# Patient Record
Sex: FEMALE | Race: WHITE | NOT HISPANIC OR LATINO | URBAN - METROPOLITAN AREA
[De-identification: names, ages, dates, MRNs, and addresses within clinical notes are randomized per-mention and may not be internally consistent; named-entity substitution may affect disease eponyms.]

---

## 2021-12-30 ENCOUNTER — EMERGENCY (EMERGENCY)
Facility: HOSPITAL | Age: 36
LOS: 1 days | Discharge: HOME | End: 2021-12-30
Attending: EMERGENCY MEDICINE
Payer: MEDICAID

## 2021-12-30 VITALS
OXYGEN SATURATION: 98 % | HEART RATE: 95 BPM | TEMPERATURE: 99 F | SYSTOLIC BLOOD PRESSURE: 144 MMHG | WEIGHT: 164.91 LBS | RESPIRATION RATE: 18 BRPM | DIASTOLIC BLOOD PRESSURE: 94 MMHG

## 2021-12-30 VITALS
TEMPERATURE: 99 F | DIASTOLIC BLOOD PRESSURE: 88 MMHG | HEART RATE: 74 BPM | RESPIRATION RATE: 17 BRPM | SYSTOLIC BLOOD PRESSURE: 133 MMHG | OXYGEN SATURATION: 100 %

## 2021-12-30 PROCEDURE — 99283 EMERGENCY DEPT VISIT LOW MDM: CPT | Mod: 25

## 2021-12-30 PROCEDURE — 99284 EMERGENCY DEPT VISIT MOD MDM: CPT

## 2021-12-30 PROCEDURE — 71045 X-RAY EXAM CHEST 1 VIEW: CPT

## 2021-12-30 PROCEDURE — 71045 X-RAY EXAM CHEST 1 VIEW: CPT | Mod: 26

## 2021-12-30 RX ORDER — ACETAMINOPHEN 500 MG
975 TABLET ORAL ONCE
Refills: 0 | Status: COMPLETED | OUTPATIENT
Start: 2021-12-30 | End: 2021-12-30

## 2021-12-30 RX ORDER — IBUPROFEN 200 MG
600 TABLET ORAL ONCE
Refills: 0 | Status: COMPLETED | OUTPATIENT
Start: 2021-12-30 | End: 2021-12-30

## 2021-12-30 RX ADMIN — Medication 600 MILLIGRAM(S): at 03:41

## 2021-12-30 RX ADMIN — Medication 975 MILLIGRAM(S): at 03:41

## 2021-12-30 NOTE — ED ADULT NURSE NOTE - OBJECTIVE STATEMENT
36yF, A&Ox4, independent, PMH HTN tested (+) for COVID on sunday, presents to the ED c/o SOB as well as myalgias. Pt c/o intermittent sob a/w cp when coughing. Patient also endorsing back pain, intermittently. Has not tried any medication at home. Pt not sob on arrival Gross neuro intact, no difficulty speaking in complete sentences, pulses x 4, moving all extremities, abdomen soft nontender nondistended, skin intact. Pt denies fevers/chills, numbness/tingling, weakness, headache, dizziness, vision changes, abd pain, n/v/d, dysuria, hematuria, bloody stools.

## 2021-12-30 NOTE — ED PROVIDER NOTE - CLINICAL SUMMARY MEDICAL DECISION MAKING FREE TEXT BOX
37F here for myalgias chest pain, SOB after testing (+) for COVID. VSS PE unremarkable.  Sx most c/w viral syndrome- no meningeal sx, visual changes, CP, focal lung findings or isolated  sx to suspect focal bacterial source.  Plan for supportive care, CXR, EKG, reassess.  Anticipate d/c w/PMD f/u.

## 2021-12-30 NOTE — ED PROVIDER NOTE - PATIENT PORTAL LINK FT
You can access the FollowMyHealth Patient Portal offered by [facility] by registering at the following website: [email or web address]. By joining [employer]’s FollowMyHealth portal, you will also be able to view your health information using other applications (apps) compatible with our system.

## 2021-12-30 NOTE — ED PROVIDER NOTE - OBJECTIVE STATEMENT
36F PMH of HTN tested (+) for COVID on sunday here for SOB since testing postive and myalgias. Intermittent shortness of breath with chest pain when she coughs. Patient also endorsing back pain that comes and goes. Has not taken any medication at home. Currently not short of breath. Denies n/v/f/d/c/ab pain.

## 2021-12-30 NOTE — ED PROVIDER NOTE - NS ED ROS FT
Gen: No fever, normal appetite  Eyes: No eye irritation or discharge  ENT: No ear pain, congestion, sore throat  Resp: (+) cough,  trouble breathing  Cardiovascular: (+) chest pain or palpitation  Gastroenteric: No nausea/vomiting, diarrhea, constipation  :  No change in urine output; no dysuria  MS: No joint or muscle pain  Skin: No rashes  Neuro: No headache; no abnormal movements  Remainder negative, except as per the HPI

## 2021-12-30 NOTE — ED PROVIDER NOTE - NSFOLLOWUPINSTRUCTIONS_ED_ALL_ED_FT
You received verbal instructions and did not sign because of the risk of infection, and expressed understanding of the discharge instructions. Please followup with your doctor (call) and consider follow up in his/her office. Please practice good hand hygiene and social distancing. If fever, cough, shortness of breath, or any worse symptoms return to the ER.  If you have symptoms, quarantine yourself for 14 days from start of symptoms should be considered.  You can call [phone] for any Covid related questions or your local department of health. ([facility] [phone], or [facility]).

## 2023-05-15 ENCOUNTER — OFFICE (OUTPATIENT)
Dept: URBAN - METROPOLITAN AREA CLINIC 92 | Facility: CLINIC | Age: 38
Setting detail: OPHTHALMOLOGY
End: 2023-05-15
Payer: MEDICAID

## 2023-05-15 DIAGNOSIS — H16.223: ICD-10-CM

## 2023-05-15 DIAGNOSIS — H52.13: ICD-10-CM

## 2023-05-15 DIAGNOSIS — H52.7: ICD-10-CM

## 2023-05-15 PROCEDURE — 92015 DETERMINE REFRACTIVE STATE: CPT | Performed by: OPHTHALMOLOGY

## 2023-05-15 PROCEDURE — 92012 INTRM OPH EXAM EST PATIENT: CPT | Performed by: OPHTHALMOLOGY

## 2023-05-15 ASSESSMENT — REFRACTION_MANIFEST
OS_CYLINDER: SPH
OD_CYLINDER: SPH
OD_CYLINDER: SPH
OS_CYLINDER: SPH
OD_SPHERE: -2.00
OS_VA1: 20/20
OS_SPHERE: -2.00
OD_VA1: 20/20
OD_VA1: 20/20
OS_VA1: 20/20
OD_SPHERE: -2.25
OS_SPHERE: -2.50

## 2023-05-15 ASSESSMENT — AXIALLENGTH_DERIVED
OS_AL: 23.4221
OD_AL: 23.4221

## 2023-05-15 ASSESSMENT — REFRACTION_AUTOREFRACTION
OD_AXIS: 113
OD_SPHERE: -2.75
OS_SPHERE: -3.00
OS_AXIS: 86
OS_CYLINDER: +0.75
OD_CYLINDER: +0.25

## 2023-05-15 ASSESSMENT — SUPERFICIAL PUNCTATE KERATITIS (SPK)
OS_SPK: T
OD_SPK: T

## 2023-05-15 ASSESSMENT — REFRACTION_CURRENTRX
OD_SPHERE: -2.25
OD_OVR_VA: 20/
OS_SPHERE: -2.25
OS_AXIS: 180
OD_CYLINDER: SPH
OD_OVR_VA: 20/
OS_CYLINDER: 0.00
OS_OVR_VA: 20/
OD_AXIS: 180
OS_CYLINDER: SPH
OS_SPHERE: -2.25
OS_OVR_VA: 20/
OD_VPRISM_DIRECTION: SV
OD_CYLINDER: 0.00
OD_SPHERE: -2.25
OS_VPRISM_DIRECTION: SV

## 2023-05-15 ASSESSMENT — KERATOMETRY
OD_AXISANGLE_DEGREES: 102
OS_K2POWER_DIOPTERS: 47.50
OS_K1POWER_DIOPTERS: 46.00
OS_AXISANGLE_DEGREES: 84
OD_K2POWER_DIOPTERS: 47.25
OD_K1POWER_DIOPTERS: 46.25
METHOD_AUTO_MANUAL: AUTO

## 2023-05-15 ASSESSMENT — SPHEQUIV_DERIVED
OS_SPHEQUIV: -2.625
OD_SPHEQUIV: -2.625

## 2023-05-15 ASSESSMENT — VISUAL ACUITY
OS_BCVA: 20/25-2
OD_BCVA: 20/25

## 2024-06-17 ASSESSMENT — KERATOMETRY
METHOD_AUTO_MANUAL: AUTO
OS_K1POWER_DIOPTERS: 46.00
OS_AXISANGLE_DEGREES: 84
OD_AXISANGLE_DEGREES: 102
OS_K2POWER_DIOPTERS: 47.50
OD_K2POWER_DIOPTERS: 47.25
OD_K1POWER_DIOPTERS: 46.25

## 2024-06-17 ASSESSMENT — REFRACTION_CURRENTRX
OS_VPRISM_DIRECTION: SV
OS_CYLINDER: SPH
OD_CYLINDER: SPH
OD_SPHERE: -2.25
OS_CYLINDER: 0.00
OS_OVR_VA: 20/
OD_OVR_VA: 20/
OD_AXIS: 180
OS_SPHERE: -2.25
OS_AXIS: 180
OD_VPRISM_DIRECTION: SV
OS_SPHERE: -2.25
OD_CYLINDER: 0.00
OD_SPHERE: -2.25
OD_OVR_VA: 20/
OS_OVR_VA: 20/

## 2024-06-17 ASSESSMENT — REFRACTION_MANIFEST
OS_VA1: 20/20
OD_SPHERE: -2.25
OD_VA1: 20/20
OS_CYLINDER: SPH
OS_CYLINDER: SPH
OD_VA1: 20/20
OS_SPHERE: -2.50
OS_VA1: 20/20
OS_SPHERE: -2.00
OD_CYLINDER: SPH
OD_CYLINDER: SPH
OD_SPHERE: -2.00

## 2024-08-05 ENCOUNTER — OFFICE (OUTPATIENT)
Dept: URBAN - METROPOLITAN AREA CLINIC 92 | Facility: CLINIC | Age: 39
Setting detail: OPHTHALMOLOGY
End: 2024-08-05
Payer: MEDICAID

## 2024-08-05 DIAGNOSIS — H16.223: ICD-10-CM

## 2024-08-05 DIAGNOSIS — H52.7: ICD-10-CM

## 2024-08-05 DIAGNOSIS — H52.13: ICD-10-CM

## 2024-08-05 PROCEDURE — 92015 DETERMINE REFRACTIVE STATE: CPT | Performed by: OPHTHALMOLOGY

## 2024-08-05 PROCEDURE — 92012 INTRM OPH EXAM EST PATIENT: CPT | Performed by: OPHTHALMOLOGY
